# Patient Record
Sex: FEMALE | Race: BLACK OR AFRICAN AMERICAN | Employment: UNEMPLOYED | URBAN - METROPOLITAN AREA
[De-identification: names, ages, dates, MRNs, and addresses within clinical notes are randomized per-mention and may not be internally consistent; named-entity substitution may affect disease eponyms.]

---

## 2017-03-30 ENCOUNTER — OFFICE VISIT (OUTPATIENT)
Dept: OBGYN CLINIC | Age: 22
End: 2017-03-30
Payer: MEDICAID

## 2017-03-30 VITALS
BODY MASS INDEX: 18.61 KG/M2 | DIASTOLIC BLOOD PRESSURE: 71 MMHG | HEART RATE: 91 BPM | RESPIRATION RATE: 12 BRPM | HEIGHT: 64 IN | WEIGHT: 109 LBS | SYSTOLIC BLOOD PRESSURE: 112 MMHG

## 2017-03-30 DIAGNOSIS — Z30.011 FAMILY PLANNING, BCP (BIRTH CONTROL PILLS) INITIAL PRESCRIPTION: Primary | ICD-10-CM

## 2017-03-30 DIAGNOSIS — Z78.9 NON-SMOKER: ICD-10-CM

## 2017-03-30 PROCEDURE — 99213 OFFICE O/P EST LOW 20 MIN: CPT | Performed by: NURSE PRACTITIONER

## 2017-03-30 RX ORDER — NORETHINDRONE ACETATE AND ETHINYL ESTRADIOL .03; 1.5 MG/1; MG/1
1 TABLET ORAL DAILY
Qty: 30 TABLET | Refills: 12 | Status: SHIPPED | OUTPATIENT
Start: 2017-03-30

## 2017-03-30 ASSESSMENT — ENCOUNTER SYMPTOMS
ALLERGIC/IMMUNOLOGIC NEGATIVE: 1
EYES NEGATIVE: 1
RESPIRATORY NEGATIVE: 1
GASTROINTESTINAL NEGATIVE: 1

## 2017-06-06 ENCOUNTER — HOSPITAL ENCOUNTER (EMERGENCY)
Age: 22
Discharge: HOME | End: 2017-06-06
Attending: EMERGENCY MEDICINE
Payer: MEDICAID

## 2017-06-06 VITALS
RESPIRATION RATE: 20 BRPM | SYSTOLIC BLOOD PRESSURE: 114 MMHG | DIASTOLIC BLOOD PRESSURE: 74 MMHG | WEIGHT: 111 LBS | OXYGEN SATURATION: 98 % | TEMPERATURE: 99.4 F | HEIGHT: 64 IN | BODY MASS INDEX: 18.95 KG/M2 | HEART RATE: 94 BPM

## 2017-06-06 DIAGNOSIS — J06.9 VIRAL URI WITH COUGH: Primary | ICD-10-CM

## 2017-06-06 DIAGNOSIS — J02.9 ACUTE PHARYNGITIS, UNSPECIFIED ETIOLOGY: ICD-10-CM

## 2017-06-06 LAB
DIRECT EXAM: NORMAL
Lab: NORMAL
Lab: NORMAL
SPECIMEN DESCRIPTION: NORMAL
SPECIMEN DESCRIPTION: NORMAL
STATUS: NORMAL
STATUS: NORMAL

## 2017-06-06 PROCEDURE — 6370000000 HC RX 637 (ALT 250 FOR IP): Performed by: EMERGENCY MEDICINE

## 2017-06-06 PROCEDURE — 87651 STREP A DNA AMP PROBE: CPT

## 2017-06-06 PROCEDURE — 99283 EMERGENCY DEPT VISIT LOW MDM: CPT

## 2017-06-06 RX ORDER — IBUPROFEN 800 MG/1
800 TABLET ORAL EVERY 8 HOURS PRN
Qty: 120 TABLET | Refills: 0 | Status: SHIPPED | OUTPATIENT
Start: 2017-06-06

## 2017-06-06 RX ORDER — IBUPROFEN 800 MG/1
800 TABLET ORAL ONCE
Status: COMPLETED | OUTPATIENT
Start: 2017-06-06 | End: 2017-06-06

## 2017-06-06 RX ADMIN — BENZOCAINE AND MENTHOL 1 LOZENGE: 15; 3.6 LOZENGE ORAL at 21:13

## 2017-06-06 RX ADMIN — IBUPROFEN 800 MG: 800 TABLET ORAL at 21:12

## 2017-06-06 ASSESSMENT — PAIN SCALES - GENERAL
PAINLEVEL_OUTOF10: 7
PAINLEVEL_OUTOF10: 7

## 2017-06-06 ASSESSMENT — PAIN DESCRIPTION - LOCATION: LOCATION: THROAT;GENERALIZED

## 2017-06-06 ASSESSMENT — PAIN DESCRIPTION - DESCRIPTORS: DESCRIPTORS: SPASM;SHARP

## 2017-06-06 ASSESSMENT — PAIN DESCRIPTION - PAIN TYPE: TYPE: ACUTE PAIN

## 2017-06-06 ASSESSMENT — PAIN DESCRIPTION - FREQUENCY: FREQUENCY: CONTINUOUS

## 2017-06-07 ASSESSMENT — ENCOUNTER SYMPTOMS
NAUSEA: 0
VOMITING: 0
DIARRHEA: 0
COUGH: 1
SINUS PRESSURE: 0
SORE THROAT: 1
ABDOMINAL PAIN: 0
RHINORRHEA: 1

## 2017-08-10 ENCOUNTER — HOSPITAL ENCOUNTER (EMERGENCY)
Age: 22
Discharge: HOME | End: 2017-08-10
Attending: EMERGENCY MEDICINE
Payer: MEDICAID

## 2017-08-10 VITALS
HEART RATE: 80 BPM | OXYGEN SATURATION: 100 % | BODY MASS INDEX: 18.78 KG/M2 | SYSTOLIC BLOOD PRESSURE: 113 MMHG | TEMPERATURE: 98.2 F | RESPIRATION RATE: 17 BRPM | HEIGHT: 64 IN | DIASTOLIC BLOOD PRESSURE: 79 MMHG | WEIGHT: 110 LBS

## 2017-08-10 DIAGNOSIS — N39.0 URINARY TRACT INFECTION WITHOUT HEMATURIA, SITE UNSPECIFIED: Primary | ICD-10-CM

## 2017-08-10 LAB
-: ABNORMAL
AMORPHOUS: ABNORMAL
BACTERIA: ABNORMAL
BILIRUBIN URINE: NEGATIVE
CASTS UA: ABNORMAL /LPF (ref 0–8)
COLOR: YELLOW
CRYSTALS, UA: ABNORMAL /HPF
EPITHELIAL CELLS UA: ABNORMAL /HPF (ref 0–5)
GLUCOSE URINE: NEGATIVE
HCG(URINE) PREGNANCY TEST: NEGATIVE
KETONES, URINE: ABNORMAL
LEUKOCYTE ESTERASE, URINE: ABNORMAL
MUCUS: ABNORMAL
NITRITE, URINE: NEGATIVE
OTHER OBSERVATIONS UA: ABNORMAL
PH UA: 6.5 (ref 5–8)
PROTEIN UA: ABNORMAL
RBC UA: ABNORMAL /HPF (ref 0–4)
RENAL EPITHELIAL, UA: ABNORMAL /HPF
SPECIFIC GRAVITY UA: 1.04 (ref 1–1.03)
TRICHOMONAS: ABNORMAL
TURBIDITY: CLEAR
URINE HGB: NEGATIVE
UROBILINOGEN, URINE: NORMAL
WBC UA: ABNORMAL /HPF (ref 0–5)
YEAST: ABNORMAL

## 2017-08-10 PROCEDURE — 84703 CHORIONIC GONADOTROPIN ASSAY: CPT

## 2017-08-10 PROCEDURE — 81001 URINALYSIS AUTO W/SCOPE: CPT

## 2017-08-10 PROCEDURE — 99284 EMERGENCY DEPT VISIT MOD MDM: CPT

## 2017-08-10 PROCEDURE — 6370000000 HC RX 637 (ALT 250 FOR IP): Performed by: EMERGENCY MEDICINE

## 2017-08-10 RX ORDER — CEPHALEXIN 500 MG/1
500 CAPSULE ORAL ONCE
Status: COMPLETED | OUTPATIENT
Start: 2017-08-10 | End: 2017-08-10

## 2017-08-10 RX ORDER — CEPHALEXIN 500 MG/1
500 CAPSULE ORAL 4 TIMES DAILY
Qty: 28 CAPSULE | Refills: 0 | Status: SHIPPED | OUTPATIENT
Start: 2017-08-10 | End: 2017-08-17

## 2017-08-10 RX ADMIN — CEPHALEXIN 500 MG: 500 CAPSULE ORAL at 23:19

## 2017-08-10 ASSESSMENT — ENCOUNTER SYMPTOMS
CONSTIPATION: 0
DIARRHEA: 0
VOMITING: 0
BACK PAIN: 0
SHORTNESS OF BREATH: 1
NAUSEA: 0
ABDOMINAL PAIN: 1
BLOOD IN STOOL: 0

## 2017-08-10 ASSESSMENT — PAIN DESCRIPTION - LOCATION: LOCATION: ABDOMEN

## 2017-08-10 ASSESSMENT — PAIN DESCRIPTION - ORIENTATION: ORIENTATION: LEFT

## 2017-08-10 ASSESSMENT — PAIN DESCRIPTION - PAIN TYPE: TYPE: ACUTE PAIN

## 2017-08-10 ASSESSMENT — PAIN DESCRIPTION - DESCRIPTORS: DESCRIPTORS: SHARP

## 2017-08-10 ASSESSMENT — PAIN SCALES - GENERAL: PAINLEVEL_OUTOF10: 6

## 2017-08-10 ASSESSMENT — PAIN DESCRIPTION - FREQUENCY: FREQUENCY: CONTINUOUS

## 2017-12-06 ENCOUNTER — HOSPITAL ENCOUNTER (OUTPATIENT)
Age: 22
Setting detail: SPECIMEN
Discharge: HOME | End: 2017-12-06
Payer: MEDICAID

## 2017-12-06 ENCOUNTER — OFFICE VISIT (OUTPATIENT)
Dept: OBGYN CLINIC | Age: 22
End: 2017-12-06
Payer: MEDICAID

## 2017-12-06 VITALS
BODY MASS INDEX: 17.58 KG/M2 | RESPIRATION RATE: 14 BRPM | SYSTOLIC BLOOD PRESSURE: 106 MMHG | WEIGHT: 103 LBS | HEIGHT: 64 IN | HEART RATE: 67 BPM | TEMPERATURE: 98.5 F | DIASTOLIC BLOOD PRESSURE: 66 MMHG

## 2017-12-06 DIAGNOSIS — Z01.419 ENCOUNTER FOR ANNUAL ROUTINE GYNECOLOGICAL EXAMINATION: ICD-10-CM

## 2017-12-06 DIAGNOSIS — N89.8 VAGINAL ODOR: ICD-10-CM

## 2017-12-06 DIAGNOSIS — N89.8 VAGINAL ODOR: Primary | ICD-10-CM

## 2017-12-06 DIAGNOSIS — F12.10 MILD TETRAHYDROCANNABINOL (THC) ABUSE: ICD-10-CM

## 2017-12-06 DIAGNOSIS — Z78.9 NON-SMOKER: ICD-10-CM

## 2017-12-06 DIAGNOSIS — Q83.3 EXTRA NIPPLE: ICD-10-CM

## 2017-12-06 LAB
DIRECT EXAM: ABNORMAL
Lab: ABNORMAL
SPECIMEN DESCRIPTION: ABNORMAL
STATUS: ABNORMAL

## 2017-12-06 PROCEDURE — 99395 PREV VISIT EST AGE 18-39: CPT | Performed by: NURSE PRACTITIONER

## 2017-12-06 ASSESSMENT — ENCOUNTER SYMPTOMS
RESPIRATORY NEGATIVE: 1
GASTROINTESTINAL NEGATIVE: 1
EYES NEGATIVE: 1

## 2017-12-06 ASSESSMENT — PATIENT HEALTH QUESTIONNAIRE - PHQ9
SUM OF ALL RESPONSES TO PHQ9 QUESTIONS 1 & 2: 0
1. LITTLE INTEREST OR PLEASURE IN DOING THINGS: 0
SUM OF ALL RESPONSES TO PHQ QUESTIONS 1-9: 0
2. FEELING DOWN, DEPRESSED OR HOPELESS: 0

## 2017-12-06 NOTE — PROGRESS NOTES
[facility] PHYSICIANS  [facility] [address]  [address] [place] [id number]  Dept: [phone]    Chief complaint:   Chief Complaint   Patient presents with    Annual Exam     not taking OCP, due to nausea        History Present Illness:   Carlos Talamantes is here today for her annual exam. She reports that she has not had taken her birth control pills since August because they were making her sick. Reports that she rarely uses condoms and does not want another baby at this time. States a lot of vaginal discharge and odor . Reports has not changed sexual partners. Current Medications (OTC/Herbal):   Current Outpatient Prescriptions   Medication Sig Dispense Refill    ibuprofen (ADVIL;MOTRIN) 800 MG tablet Take 1 tablet by mouth every 8 hours as needed for Pain or Fever 120 tablet 0    benzocaine-menthol (CEPACOL SORE THROAT) 15-3.6 MG lozenge Take 1 lozenge by mouth every 2 hours as needed for Sore Throat 18 lozenge 2    Norethindrone Acet-Ethinyl Est (LOESTRIN 1.5/30, 21,) 1.5-30 MG-MCG TABS Take 1 tablet by mouth daily 30 tablet 12     No current facility-administered medications for this visit. Allergies: No Known Allergies  Past Medical History: History reviewed. No pertinent past medical history. Past Surgical History: History reviewed. No pertinent surgical history.   Obstetric History:   3  Para 1  Gynecologic History: LNMP 15   Menarche 11  Duration 5 d    Interval q  28-30 d  Tampons/Pads in a day: NA  Last Pap:  10/16       Any history of abnormal paps none    PriorColpo/Biopsy none     Last Mammogram NA   Result  Contraception: none  Complications: none  STDs: chlamydia  Psychosocial History: Occupation:   housekeeping   Smoker No    PPD/PPYr    Alcohol/Amount No    Drug Use Yes  Marijuana    Type:    Caffeine Yes    At risk for depression No    Abuse:   No    Social History     Social History    Marital status: Single     Spouse name: N/A    Number of children: N/A    Years of education: N/A     Occupational History    Not on file. Social History Main Topics    Smoking status: Never Smoker    Smokeless tobacco: Never Used    Alcohol use No      Comment: occ    Drug use: No    Sexual activity: Yes     Partners: Male     Birth control/ protection: Injection     Other Topics Concern    Not on file     Social History Narrative    No narrative on file       Family History   Problem Relation Age of Onset    Diabetes Maternal Grandmother     High Blood Pressure Maternal Grandmother        Review of Systems:   Review of Systems   HENT: Negative. Eyes: Negative. Respiratory: Negative. Cardiovascular: Negative. Gastrointestinal: Negative. Genitourinary:        Reports odor with urination    Skin: Negative. Neurological: Negative. Endo/Heme/Allergies: Negative. Psychiatric/Behavioral: Negative. Physical exam:  vitals: /66 (Site: Right Arm, Position: Sitting, Cuff Size: Small Adult)   Pulse 67   Temp 98.5 °F (36.9 °C) (Oral)   Resp 14   Ht 5' 4\" (1.626 m)   Wt 103 lb (46.7 kg)   LMP 11/24/2017   BMI 17.68 kg/m²   Gen: alert, no apparent distress  HEENT: No pathologic skin lesions noted,NC/AT, normal midline nontender thyroid   Lung Exam: Clear to auscultation in all fields bilaterally, without wheezes,rales or rhonchi. Cardiac Exam: Normal sinus rhythm and rate, without murmurs, rubs or gallops appreciated. Breast Exam: Symmetric without pathological skin changes, nontender without discrete suspicious masses palpated, supraclavicular or axillary adenopathy or nipple discharge noted. Abdominal Exam: Nontender to deep palpation without organomegaly, masses or CVAT appreciated, BS positive. No spinal deformation or tenderness. External Genitalia: Normal development without vulvar, vaginal or cervical lesions noted. Normal vaginal discharge, uterus anterior, 4-6 weeks without CMT.   Adnexa nontender without abnormal masses

## 2017-12-06 NOTE — PATIENT INSTRUCTIONS
Patient Education        Well Visit, Ages 25 to 48: Care Instructions  Your Care Instructions    Physical exams can help you stay healthy. Your doctor has checked your overall health and may have suggested ways to take good care of yourself. He or she also may have recommended tests. At home, you can help prevent illness with healthy eating, regular exercise, and other steps. Follow-up care is a key part of your treatment and safety. Be sure to make and go to all appointments, and call your doctor if you are having problems. It's also a good idea to know your test results and keep a list of the medicines you take. How can you care for yourself at home? · Reach and stay at a healthy weight. This will lower your risk for many problems, such as obesity, diabetes, heart disease, and high blood pressure. · Get at least 30 minutes of physical activity on most days of the week. Walking is a good choice. You also may want to do other activities, such as running, swimming, cycling, or playing tennis or team sports. Discuss any changes in your exercise program with your doctor. · Do not smoke or allow others to smoke around you. If you need help quitting, talk to your doctor about stop-smoking programs and medicines. These can increase your chances of quitting for good. · Talk to your doctor about whether you have any risk factors for sexually transmitted infections (STIs). Having one sex partner (who does not have STIs and does not have sex with anyone else) is a good way to avoid these infections. · Use birth control if you do not want to have children at this time. Talk with your doctor about the choices available and what might be best for you. · Protect your skin from too much sun. When you're outdoors from 10 a.m. to 4 p.m., stay in the shade or cover up with clothing and a hat with a wide brim. Wear sunglasses that block UV rays.  Even when it's cloudy, put broad-spectrum sunscreen (SPF 30 or higher) on any exposed skin. · See a dentist one or two times a year for checkups and to have your teeth cleaned. · Wear a seat belt in the car. · Drink alcohol in moderation, if at all. That means no more than 2 drinks a day for men and 1 drink a day for women. Follow your doctor's advice about when to have certain tests. These tests can spot problems early. For everyone  · Cholesterol. Have the fat (cholesterol) in your blood tested after age 21. Your doctor will tell you how often to have this done based on your age, family history, or other things that can increase your risk for heart disease. · Blood pressure. Have your blood pressure checked during a routine doctor visit. Your doctor will tell you how often to check your blood pressure based on your age, your blood pressure results, and other factors. · Vision. Talk with your doctor about how often to have a glaucoma test.  · Diabetes. Ask your doctor whether you should have tests for diabetes. · Colon cancer. Have a test for colon cancer at age 48. You may have one of several tests. If you are younger than 48, you may need a test earlier if you have any risk factors. Risk factors include whether you already had a precancerous polyp removed from your colon or whether your parent, brother, sister, or child has had colon cancer. For women  · Breast exam and mammogram. Talk to your doctor about when you should have a clinical breast exam and a mammogram. Medical experts differ on whether and how often women under 50 should have these tests. Your doctor can help you decide what is right for you. · Pap test and pelvic exam. Begin Pap tests at age 24. A Pap test is the best way to find cervical cancer. The test often is part of a pelvic exam. Ask how often to have this test.  · Tests for sexually transmitted infections (STIs). Ask whether you should have tests for STIs.  You may be at risk if you have sex with more than one person, especially if your partners do not wear

## 2017-12-07 ENCOUNTER — TELEPHONE (OUTPATIENT)
Dept: OBGYN CLINIC | Age: 22
End: 2017-12-07

## 2017-12-07 DIAGNOSIS — A74.9 CHLAMYDIA: Primary | ICD-10-CM

## 2017-12-07 LAB
C TRACH DNA GENITAL QL NAA+PROBE: ABNORMAL
N. GONORRHOEAE DNA: NEGATIVE

## 2017-12-07 RX ORDER — AZITHROMYCIN 500 MG/1
TABLET, FILM COATED ORAL
Qty: 2 TABLET | Refills: 0 | Status: SHIPPED | OUTPATIENT
Start: 2017-12-07

## 2017-12-07 NOTE — TELEPHONE ENCOUNTER
Patient called in stating that when she was in the office yesterday she spoke to you about her current BC pills making her sick and a different BC prescription was suppose to be sent in. I see no notes about that be discussed and there are no new prescription for bc. Do you recall this.

## 2017-12-19 LAB
CYTOLOGY REPORT: NORMAL
HPV SAMPLE: ABNORMAL
HPV SOURCE: ABNORMAL
HPV, GENOTYPE 16: NOT DETECTED
HPV, GENOTYPE 18: NOT DETECTED
HPV, HIGH RISK OTHER: DETECTED
HPV, INTERPRETATION: ABNORMAL

## 2017-12-28 ENCOUNTER — OFFICE VISIT (OUTPATIENT)
Dept: OBGYN CLINIC | Age: 22
End: 2017-12-28
Payer: MEDICAID

## 2017-12-28 ENCOUNTER — HOSPITAL ENCOUNTER (OUTPATIENT)
Age: 22
Setting detail: SPECIMEN
Discharge: HOME | End: 2017-12-28
Payer: MEDICAID

## 2017-12-28 VITALS
DIASTOLIC BLOOD PRESSURE: 61 MMHG | BODY MASS INDEX: 18.27 KG/M2 | OXYGEN SATURATION: 100 % | WEIGHT: 107 LBS | HEART RATE: 85 BPM | HEIGHT: 64 IN | RESPIRATION RATE: 12 BRPM | SYSTOLIC BLOOD PRESSURE: 101 MMHG

## 2017-12-28 DIAGNOSIS — R35.0 URINARY FREQUENCY: ICD-10-CM

## 2017-12-28 DIAGNOSIS — Z86.19 HISTORY OF CHLAMYDIA: ICD-10-CM

## 2017-12-28 DIAGNOSIS — B96.89 BV (BACTERIAL VAGINOSIS): Primary | ICD-10-CM

## 2017-12-28 DIAGNOSIS — N76.0 BV (BACTERIAL VAGINOSIS): Primary | ICD-10-CM

## 2017-12-28 PROCEDURE — G8427 DOCREV CUR MEDS BY ELIG CLIN: HCPCS | Performed by: NURSE PRACTITIONER

## 2017-12-28 PROCEDURE — 99213 OFFICE O/P EST LOW 20 MIN: CPT | Performed by: NURSE PRACTITIONER

## 2017-12-28 PROCEDURE — G8484 FLU IMMUNIZE NO ADMIN: HCPCS | Performed by: NURSE PRACTITIONER

## 2017-12-28 PROCEDURE — 1036F TOBACCO NON-USER: CPT | Performed by: NURSE PRACTITIONER

## 2017-12-28 PROCEDURE — G8419 CALC BMI OUT NRM PARAM NOF/U: HCPCS | Performed by: NURSE PRACTITIONER

## 2017-12-28 RX ORDER — METRONIDAZOLE 500 MG/1
500 TABLET ORAL 2 TIMES DAILY
Qty: 14 TABLET | Refills: 0 | Status: SHIPPED | OUTPATIENT
Start: 2017-12-28 | End: 2018-01-04

## 2017-12-28 ASSESSMENT — ENCOUNTER SYMPTOMS
RESPIRATORY NEGATIVE: 1
EYES NEGATIVE: 1
GASTROINTESTINAL NEGATIVE: 1
ALLERGIC/IMMUNOLOGIC NEGATIVE: 1

## 2017-12-28 NOTE — PROGRESS NOTES
dip neg for infection      Plan:      Patient was seen with total face to face time of 15 minutes. More than 50% of this visit was on counseling and education regarding her   1. BV (bacterial vaginosis)  metroNIDAZOLE (FLAGYL) 500 MG tablet    Wet Prep, Genital   2. History of chlamydia  C.trachomatis N.gonorrhoeae DNA   3. Urinary frequency  POCT Urinalysis No Micro (Auto)    and her options. She was also counseled on her preventative health maintenance recommendations and follow-up.

## 2017-12-28 NOTE — PATIENT INSTRUCTIONS
Patient Education        Bacterial Vaginosis: Care Instructions  Your Care Instructions    Bacterial vaginosis is a type of vaginal infection. It is caused by excess growth of certain bacteria that are normally found in the vagina. Symptoms can include itching, swelling, pain when you urinate or have sex, and a gray or yellow discharge with a \"fishy\" odor. It is not considered an infection that is spread through sexual contact. Although symptoms can be annoying and uncomfortable, bacterial vaginosis does not usually cause other health problems. However, if you have it while you are pregnant, it can cause complications. While the infection may go away on its own, most doctors use antibiotics to treat it. You may have been prescribed pills or vaginal cream. With treatment, bacterial vaginosis usually clears up in 5 to 7 days. Follow-up care is a key part of your treatment and safety. Be sure to make and go to all appointments, and call your doctor if you are having problems. It's also a good idea to know your test results and keep a list of the medicines you take. How can you care for yourself at home? · Take your antibiotics as directed. Do not stop taking them just because you feel better. You need to take the full course of antibiotics. · Do not eat or drink anything that contains alcohol if you are taking metronidazole (Flagyl). · Keep using your medicine if you start your period. Use pads instead of tampons while using a vaginal cream or suppository. Tampons can absorb the medicine. · Wear loose cotton clothing. Do not wear nylon and other materials that hold body heat and moisture close to the skin. · Do not scratch. Relieve itching with a cold pack or a cool bath. · Do not wash your vaginal area more than once a day. Use plain water or a mild, unscented soap. Do not douche. When should you call for help?   Watch closely for changes in your health, and be sure to contact your doctor if:  ? · You have listed in this medication guide. What should I discuss with my healthcare provider before taking metronidazole? You should not use this medication if you are allergic to metronidazole, or if you are in the first trimester of pregnancy. Tell your doctor if you are pregnant or plan to become pregnant during treatment. Before taking metronidazole, tell your doctor if you are allergic to any drugs, or if you have:  · liver disease;  · a stomach or intestinal disease such as Crohn's disease;  · a blood cell disorder such as anemia (lack of red blood cells) or leukopenia (lack of white blood cells);  · epilepsy or other seizure disorder; or  · nerve disorders. If you have any of these conditions, you may need a dose adjustment or special tests to safely take this medication. Metronidazole can pass into breast milk and may harm a nursing baby. Do not use this medication without telling your doctor if you are breast-feeding a baby. How should I take metronidazole? Take exactly as prescribed by your doctor. Do not take in larger or smaller amounts or for longer than recommended. Follow the directions on your prescription label. Take the extended-release metronidazole tablet (Flagyl ER) on an empty stomach, at least 1 hour before or 2 hours after eating a meal.  Do not crush, chew, or break an extended-release tablet. Swallow it whole. Breaking the pill may cause too much of the drug to be released at one time. Take this medication for the full prescribed length of time. Your symptoms may improve before the infection is completely cleared. Skipping doses may also increase your risk of further infection that is resistant to antibiotics. Metronidazole will not treat a viral infection such as the common cold or flu. To be sure this medication is not causing harmful effects, your blood may need to be tested often. Your liver function may also need to be tested. Visit your doctor regularly.   This medication can cause unusual results with certain medical tests. Tell any doctor who treats you that you are using metronidazole. Store at room temperature away from moisture and heat. What happens if I miss a dose? Take the missed dose as soon as you remember. Skip the missed dose if it is almost time for your next scheduled dose. Do not take extra medicine to make up the missed dose. What happens if I overdose? Seek emergency medical attention or call the Poison Help line at 1-566.664.4721. Overdose symptoms may include nausea, vomiting, dizziness, loss of balance or coordination, numbness and tingling, or seizures (convulsions). What should I avoid while taking metronidazole? Do not drink alcohol while you are taking metronidazole and for at least 3 days after you stop taking it. You may have unpleasant side effects such as fast heartbeats, warmth or redness under your skin, tingly feeling, nausea, and vomiting. Check the labels of any medicines or food products you use to make sure they do not contain alcohol. Antibiotic medicines can cause diarrhea, which may be a sign of a new infection. If you have diarrhea that is watery or bloody, stop taking metronidazole and call your doctor. Do not use anti-diarrhea medicine unless your doctor tells you to. What are the possible side effects of metronidazole? Get emergency medical help if you have any of these signs of an allergic reaction: hives; difficulty breathing; swelling of your face, lips, tongue, or throat.   Call your doctor at once if you have any of these serious side effects:  · numbness or tingling in your hands or feet;  · white patches or sores inside your mouth or on your lips;  · pain or burning when you urinate;  · diarrhea that is watery or bloody;  · vision problems, pain behind your eyes;  · trouble concentrating, slurred speech, mood or behavior changes, tremors, muscle twitching, seizure (convulsions);  · fever, chills, muscle pain, confusion, headache, sore throat, neck stiffness, increased sensitivity to light, drowsiness, nausea and vomiting; or  · severe skin reaction -- fever, sore throat, swelling in your face or tongue, burning in your eyes, skin pain, followed by a red or purple skin rash that spreads (especially in the face or upper body) and causes blistering and peeling. Less serious side effects may include:  · stomach pain, diarrhea;  · dizziness, loss of balance;  · vaginal itching or discharge;  · dry mouth or unpleasant metallic taste;  · cough, sneezing, runny or stuffy nose; or  · swollen or sore tongue. This is not a complete list of side effects and others may occur. Call your doctor for medical advice about side effects. You may report side effects to FDA at 5-992-FDA-1360. What other drugs will affect metronidazole? Tell your doctor about all other medicines you use, especially:  · cimetidine (Tagamet);  · seizure medication such as phenytoin (Dilantin) or phenobarbital (Luminal, Solfoton);  · a blood thinner such as warfarin (Coumadin, Jantoven);  · lithium (Lithobid, Eskalith, others); or  · disulfiram (Antabuse). This list is not complete and other drugs may interact with metronidazole. Tell your doctor about all medications you use. This includes prescription, over-the-counter, vitamin, and herbal products. Do not start a new medication without telling your doctor. Where can I get more information? Your pharmacist can provide more information about metronidazole. Remember, keep this and all other medicines out of the reach of children, never share your medicines with others, and use this medication only for the indication prescribed. Every effort has been made to ensure that the information provided by [facility] is accurate, up-to-date, and complete, but no guarantee is made to that effect. Drug information contained herein may be time sensitive.  [facility] information has been compiled for use by healthcare practitioners and consumers in the [place] and therefore [facility] does not warrant that uses outside of the [place] are appropriate, unless specifically indicated otherwise. [facility]'s drug information does not endorse drugs, diagnose patients or recommend therapy. [facility] drug information is an informational resource designed to assist licensed healthcare practitioners in caring for their patients and/or to serve consumers viewing this service as a supplement to, and not a substitute for, the expertise, skill, knowledge and judgment of healthcare practitioners. The absence of a warning for a given drug or drug combination in no way should be construed to indicate that the drug or drug combination is safe, effective or appropriate for any given patient. [facility] does not assume any responsibility for any aspect of healthcare administered with the aid of information [facility] provides. The information contained herein is not intended to cover all possible uses, directions, precautions, warnings, drug interactions, allergic reactions, or adverse effects. If you have questions about the drugs you are taking, check with your doctor, nurse or pharmacist.  Copyright 8454-1450 [address]. Version: 10.01. Revision date: 2/14/2011. Care instructions adapted under license by [facility] ([facility]). If you have questions about a medical condition or this instruction, always ask your healthcare professional. [place] [address] any warranty or liability for your use of this information.

## 2017-12-29 LAB
C TRACH DNA GENITAL QL NAA+PROBE: NEGATIVE
N. GONORRHOEAE DNA: NEGATIVE

## 2018-10-28 ENCOUNTER — HOSPITAL ENCOUNTER (EMERGENCY)
Age: 23
Discharge: HOME | End: 2018-10-28
Attending: EMERGENCY MEDICINE
Payer: MEDICAID

## 2018-10-28 VITALS
BODY MASS INDEX: 18.44 KG/M2 | OXYGEN SATURATION: 97 % | HEART RATE: 57 BPM | HEIGHT: 64 IN | RESPIRATION RATE: 18 BRPM | DIASTOLIC BLOOD PRESSURE: 81 MMHG | SYSTOLIC BLOOD PRESSURE: 118 MMHG | TEMPERATURE: 98.1 F | WEIGHT: 108 LBS

## 2018-10-28 DIAGNOSIS — N76.0 BACTERIAL VAGINOSIS: ICD-10-CM

## 2018-10-28 DIAGNOSIS — A64 STI (SEXUALLY TRANSMITTED INFECTION): Primary | ICD-10-CM

## 2018-10-28 DIAGNOSIS — B96.89 BACTERIAL VAGINOSIS: ICD-10-CM

## 2018-10-28 LAB
BILIRUBIN URINE: NEGATIVE
COLOR: YELLOW
COMMENT UA: NORMAL
DIRECT EXAM: ABNORMAL
GLUCOSE URINE: NEGATIVE
HCG(URINE) PREGNANCY TEST: NEGATIVE
HIV AG/AB: NONREACTIVE
KETONES, URINE: NEGATIVE
LEUKOCYTE ESTERASE, URINE: NEGATIVE
Lab: ABNORMAL
NITRITE, URINE: NEGATIVE
PH UA: 5 (ref 5–8)
PROTEIN UA: NEGATIVE
SPECIFIC GRAVITY UA: 1.02 (ref 1–1.03)
SPECIMEN DESCRIPTION: ABNORMAL
STATUS: ABNORMAL
T. PALLIDUM, IGG: NONREACTIVE
TURBIDITY: CLEAR
URINE HGB: NEGATIVE
UROBILINOGEN, URINE: NORMAL

## 2018-10-28 PROCEDURE — 81003 URINALYSIS AUTO W/O SCOPE: CPT

## 2018-10-28 PROCEDURE — 87660 TRICHOMONAS VAGIN DIR PROBE: CPT

## 2018-10-28 PROCEDURE — 87491 CHLMYD TRACH DNA AMP PROBE: CPT

## 2018-10-28 PROCEDURE — 87510 GARDNER VAG DNA DIR PROBE: CPT

## 2018-10-28 PROCEDURE — 86780 TREPONEMA PALLIDUM: CPT

## 2018-10-28 PROCEDURE — 6360000002 HC RX W HCPCS: Performed by: EMERGENCY MEDICINE

## 2018-10-28 PROCEDURE — 84703 CHORIONIC GONADOTROPIN ASSAY: CPT

## 2018-10-28 PROCEDURE — 96372 THER/PROPH/DIAG INJ SC/IM: CPT

## 2018-10-28 PROCEDURE — 6370000000 HC RX 637 (ALT 250 FOR IP): Performed by: EMERGENCY MEDICINE

## 2018-10-28 PROCEDURE — 87389 HIV-1 AG W/HIV-1&-2 AB AG IA: CPT

## 2018-10-28 PROCEDURE — 87591 N.GONORRHOEAE DNA AMP PROB: CPT

## 2018-10-28 PROCEDURE — 87480 CANDIDA DNA DIR PROBE: CPT

## 2018-10-28 PROCEDURE — 99283 EMERGENCY DEPT VISIT LOW MDM: CPT

## 2018-10-28 RX ORDER — AZITHROMYCIN 250 MG/1
1000 TABLET, FILM COATED ORAL ONCE
Status: COMPLETED | OUTPATIENT
Start: 2018-10-28 | End: 2018-10-28

## 2018-10-28 RX ORDER — CEFTRIAXONE SODIUM 250 MG/1
250 INJECTION, POWDER, FOR SOLUTION INTRAMUSCULAR; INTRAVENOUS ONCE
Status: COMPLETED | OUTPATIENT
Start: 2018-10-28 | End: 2018-10-28

## 2018-10-28 RX ORDER — METRONIDAZOLE 500 MG/1
500 TABLET ORAL 2 TIMES DAILY
Qty: 14 TABLET | Refills: 0 | Status: SHIPPED | OUTPATIENT
Start: 2018-10-28 | End: 2018-11-04

## 2018-10-28 RX ORDER — METRONIDAZOLE 500 MG/1
500 TABLET ORAL ONCE
Status: COMPLETED | OUTPATIENT
Start: 2018-10-28 | End: 2018-10-28

## 2018-10-28 RX ADMIN — CEFTRIAXONE SODIUM 250 MG: 250 INJECTION, POWDER, FOR SOLUTION INTRAMUSCULAR; INTRAVENOUS at 16:23

## 2018-10-28 RX ADMIN — AZITHROMYCIN 1000 MG: 250 TABLET, FILM COATED ORAL at 16:23

## 2018-10-28 RX ADMIN — METRONIDAZOLE 500 MG: 500 TABLET ORAL at 18:33

## 2018-10-28 ASSESSMENT — ENCOUNTER SYMPTOMS
ABDOMINAL PAIN: 0
NAUSEA: 0
EYE PAIN: 0
VOMITING: 0
SHORTNESS OF BREATH: 0
DIARRHEA: 0
COUGH: 0
CONSTIPATION: 0
RHINORRHEA: 0

## 2018-10-28 NOTE — ED NOTES
Labeled blood and urine specimens collected and sent to lab via tube system.      Danica Cedillo RN  10/28/18 9734

## 2018-10-28 NOTE — ED PROVIDER NOTES
POSITIVE for Gardnerella vaginalis. (A)     Direct Exam NEGATIVE for Candida sp. Direct Exam NEGATIVE for Trichomonas vaginalis     Direct Exam       Method of testing is a DNA probe intended for detection and identification of    Direct Exam        Candida species, Gardnerella vaginalis, and Trichomonas vaginalis nucleic acid    Direct Exam        in vaginal fluid specimens from patients with symptoms of vaginitis/vaginosis. Status FINAL 10/28/2018    Urinalysis Reflex to Culture   Result Value Ref Range    Color, UA YELLOW YEL    Turbidity UA CLEAR CLEAR    Glucose, Ur NEGATIVE NEG    Bilirubin Urine NEGATIVE NEG    Ketones, Urine NEGATIVE NEG    Specific Gravity, UA 1.024 1.005 - 1.030    Urine Hgb NEGATIVE NEG    pH, UA 5.0 5.0 - 8.0    Protein, UA NEGATIVE NEG    Urobilinogen, Urine Normal NORM    Nitrite, Urine NEGATIVE NEG    Leukocyte Esterase, Urine NEGATIVE NEG    Urinalysis Comments       Microscopic exam not performed based on chemical results unless requested in   PREGNANCY, URINE   Result Value Ref Range    HCG(Urine) Pregnancy Test NEGATIVE NEG   T. pallidum Ab   Result Value Ref Range    T. pallidum, IgG NONREACTIVE NR   HIV Screen   Result Value Ref Range    HIV Ag/Ab NONREACTIVE NR       IMPRESSION: Patient value added by myself and attending physician. Patient appears in no acute distress. Vital signs normal on arrival.  Abdomen soft, nontender, nondistended. We'll continue with urinalysis, urine pregnant, pelvic labs. We'll get syphilis and HIV testing due to concern for STD. Patient instructed that gathering chlamydia will not come back today and she would like to be treated prophylactically. RADIOLOGY:  None    EKG  None    All EKG's are interpreted by the Emergency Department Physician who either signs or Co-signs this chart in the absence of a cardiologist.    EMERGENCY DEPARTMENT COURSE:  Vaginitis probe positive for BV. Urine pregnancy negative.   Urinalysis without signs of infection. Patient treated prophylactically for gonorrhea and chlamydia. Labs drawn for syphilis and HIV. Patient would like to be discharged. Given Flagyl for home. Patient instructed to follow-up with primary care provider. Patient agrees with plan. Patient given return instructions. PROCEDURES:  None    CONSULTS:  None    CRITICAL CARE:  None    FINAL IMPRESSION      1. STI (sexually transmitted infection)    2.  Bacterial vaginosis          DISPOSITION / PLAN     DISPOSITION Decision To Discharge 10/28/2018 06:26:33 PM      PATIENT REFERRED TO:  your PCP or a clinic list    Call in 1 day  to follow up within one week    [facility]  [address] [place] [address]  [phone]  Today  As needed, If symptoms worsen      DISCHARGE MEDICATIONS:  Discharge Medication List as of 10/28/2018  6:28 PM      START taking these medications    Details   metroNIDAZOLE (FLAGYL) 500 MG tablet Take 1 tablet by mouth 2 times daily for 7 days, Disp-14 tablet, R-0Print             Concha Roa DO  Emergency Medicine Resident    (Please note that portions of thisnote were completed with a voice recognition program.  Efforts were made to edit the dictations but occasionally words are mis-transcribed.)        Concha Roa DO  10/28/18 2122

## 2018-10-29 LAB
C TRACH DNA GENITAL QL NAA+PROBE: NEGATIVE
N. GONORRHOEAE DNA: NEGATIVE

## 2019-05-02 ENCOUNTER — HOSPITAL ENCOUNTER (EMERGENCY)
Age: 24
Discharge: HOME | End: 2019-05-02
Attending: EMERGENCY MEDICINE
Payer: COMMERCIAL

## 2019-05-02 VITALS
DIASTOLIC BLOOD PRESSURE: 72 MMHG | OXYGEN SATURATION: 100 % | HEIGHT: 64 IN | WEIGHT: 112 LBS | HEART RATE: 87 BPM | TEMPERATURE: 97 F | SYSTOLIC BLOOD PRESSURE: 109 MMHG | BODY MASS INDEX: 19.12 KG/M2 | RESPIRATION RATE: 16 BRPM

## 2019-05-02 DIAGNOSIS — N93.9 VAGINAL BLEEDING: Primary | ICD-10-CM

## 2019-05-02 LAB
-: ABNORMAL
AMORPHOUS: ABNORMAL
BACTERIA: ABNORMAL
BILIRUBIN URINE: NEGATIVE
CASTS UA: ABNORMAL /LPF (ref 0–2)
COLOR: YELLOW
COMMENT UA: ABNORMAL
CRYSTALS, UA: ABNORMAL /HPF
DIRECT EXAM: NORMAL
EPITHELIAL CELLS UA: ABNORMAL /HPF (ref 0–5)
GLUCOSE URINE: NEGATIVE
HCG(URINE) PREGNANCY TEST: NEGATIVE
KETONES, URINE: ABNORMAL
LEUKOCYTE ESTERASE, URINE: NEGATIVE
Lab: NORMAL
MUCUS: ABNORMAL
NITRITE, URINE: NEGATIVE
OTHER OBSERVATIONS UA: ABNORMAL
PH UA: 5 (ref 5–8)
PROTEIN UA: NEGATIVE
RBC UA: ABNORMAL /HPF (ref 0–2)
RENAL EPITHELIAL, UA: ABNORMAL /HPF
SPECIFIC GRAVITY UA: 1.03 (ref 1–1.03)
SPECIMEN DESCRIPTION: NORMAL
TRICHOMONAS: ABNORMAL
TURBIDITY: ABNORMAL
URINE HGB: ABNORMAL
UROBILINOGEN, URINE: NORMAL
WBC UA: ABNORMAL /HPF (ref 0–5)
YEAST: ABNORMAL

## 2019-05-02 PROCEDURE — 84703 CHORIONIC GONADOTROPIN ASSAY: CPT

## 2019-05-02 PROCEDURE — 87491 CHLMYD TRACH DNA AMP PROBE: CPT

## 2019-05-02 PROCEDURE — 87510 GARDNER VAG DNA DIR PROBE: CPT

## 2019-05-02 PROCEDURE — 87660 TRICHOMONAS VAGIN DIR PROBE: CPT

## 2019-05-02 PROCEDURE — 99284 EMERGENCY DEPT VISIT MOD MDM: CPT

## 2019-05-02 PROCEDURE — 81001 URINALYSIS AUTO W/SCOPE: CPT

## 2019-05-02 PROCEDURE — 87591 N.GONORRHOEAE DNA AMP PROB: CPT

## 2019-05-02 PROCEDURE — 87480 CANDIDA DNA DIR PROBE: CPT

## 2019-05-02 PROCEDURE — 87086 URINE CULTURE/COLONY COUNT: CPT

## 2019-05-02 ASSESSMENT — ENCOUNTER SYMPTOMS
DIARRHEA: 0
VOMITING: 0
NAUSEA: 0
BACK PAIN: 0
ABDOMINAL PAIN: 0

## 2019-05-02 NOTE — ED PROVIDER NOTES
[facility] ED  Emergency Department Encounter  Mid Level Provider     Pt Name: Tank Sharpe  MRN: [[id number]]  [place] 1995  Date of evaluation: 5/2/19  PCP:  No primary care provider on file. CHIEF COMPLAINT       Chief Complaint   Patient presents with    Vaginal Bleeding     pt with abnormal menstrual cycle. states that she has been spotting and now having clots. pt unsure if pregnant, but is currently trying to get pregnant. denies urinary symptoms at this time. denies risk of std. HISTORY OF PRESENT ILLNESS  (Location/Symptom, Timing/Onset,Context/Setting, Quality, Duration, Modifying Factors, Severity.)      Tank Sharpe is a 20 y.o. female who presents with menstrual cycle that started 2 days ago. Patient states last cycle 2 weeks ago. She is actively trying to get pregnant. Denies abdominal back pain. Denies concern for vaginal discharge or STD. Denies urinary symptoms. Patient well-appearing without apparent distress. One prior pregnancy     PAST MEDICAL /SURGICAL / SOCIAL / FAMILY HISTORY      has no past medical history on file. has no past surgical history on file.     Social History     Socioeconomic History    Marital status: Single     Spouse name: Not on file    Number of children: Not on file    Years of education: Not on file    Highest education level: Not on file   Occupational History    Not on file   Social Needs    Financial resource strain: Not on file    Food insecurity:     Worry: Not on file     Inability: Not on file    Transportation needs:     Medical: Not on file     Non-medical: Not on file   Tobacco Use    Smoking status: Never Smoker    Smokeless tobacco: Never Used   Substance and Sexual Activity    Alcohol use: No     Alcohol/week: 0.0 oz     Comment: occ    Drug use: No    Sexual activity: Yes     Partners: Male     Birth control/protection: Injection   Lifestyle    Physical activity:     Days per week: Not on file Minutes per session: Not on file    Stress: Not on file   Relationships    Social connections:     Talks on phone: Not on file     Gets together: Not on file     Attends Yarsanism service: Not on file     Active member of club or organization: Not on file     Attends meetings of clubs or organizations: Not on file     Relationship status: Not on file    Intimate partner violence:     Fear of current or ex partner: Not on file     Emotionally abused: Not on file     Physically abused: Not on file     Forced sexual activity: Not on file   Other Topics Concern    Not on file   Social History Narrative    Not on file       Family History   Problem Relation Age of Onset    Diabetes Maternal Grandmother     High Blood Pressure Maternal Grandmother        Allergies:  Patient has no known allergies. Home Medications:  Prior to Admission medications    Medication Sig Start Date End Date Taking? Authorizing Provider   azithromycin (ZITHROMAX) 500 MG tablet Take two tablets together 12/7/17   JAZMIN Kim CNP   benzocaine-menthol (CEPACOL SORE THROAT) 15-3.6 MG lozenge Take 1 lozenge by mouth every 2 hours as needed for Sore Throat 6/6/17   Lolly Schafer MD   ibuprofen (ADVIL;MOTRIN) 800 MG tablet Take 1 tablet by mouth every 8 hours as needed for Pain or Fever 6/6/17   Lolly Schafer MD   Norethindrone Acet-Ethinyl Est (Delona Roby 1.5/30, 21,) 1.5-30 MG-MCG TABS Take 1 tablet by mouth daily 3/30/17   JAZMIN Kim CNP       REVIEW OF SYSTEMS    (2-9 systems for level 4, 10 or more for level 5)      Review of Systems   Constitutional: Negative for chills and fever. Gastrointestinal: Negative for abdominal pain, diarrhea, nausea and vomiting. Genitourinary: Positive for vaginal bleeding. Negative for difficulty urinating, dysuria and vaginal discharge. Musculoskeletal: Negative for back pain and myalgias.        PHYSICALEXAM   (upto 7 for level 4, 8 or more for level 5)      INITIAL VITALS:  height is 5' 4\" (1.626 m) and weight is 112 lb (50.8 kg). Her oral temperature is 97 °F (36.1 °C). Her blood pressure is 109/72 and her pulse is 87. Her respiration is 16 and oxygen saturation is 100%. Physical Exam   Constitutional: She is oriented to person, place, and time. She appears well-developed and well-nourished. No distress. HENT:   Head: Normocephalic. Eyes: Pupils are equal, round, and reactive to light. Neck: Normal range of motion. Neck supple. Cardiovascular: Normal rate. Pulmonary/Chest: Effort normal. No respiratory distress. Abdominal: Soft. There is no tenderness. There is no rebound and no guarding. Genitourinary:   Genitourinary Comments: Minimal blood in vault. No cervical motion tenderness, no adnexal tenderness   Musculoskeletal: Normal range of motion. Neurological: She is alert and oriented to person, place, and time. Skin: Skin is warm and dry. Capillary refill takes less than 2 seconds. Psychiatric: She has a normal mood and affect. Her behavior is normal. Judgment and thought content normal.   Nursing note and vitals reviewed. DIFFERENTIAL  DIAGNOSIS   Dysfunctional uterine bleeding, ectopic, miscarriage    PLAN (LABS / IMAGING / EKG):  Orders Placed This Encounter   Procedures    C.trachomatis N.gonorrhoeae DNA    VAGINITIS DNA PROBE    Urine Culture    Pregnancy, Urine    Urinalysis Reflex to Culture    Microscopic Urinalysis       MEDICATIONS ORDERED:  No orders of the defined types were placed in this encounter. Controlled Substances Monitoring:      DIAGNOSTIC RESULTS / EMERGENCY DEPARTMENT COURSE / MDM     RADIOLOGY:   I directly visualized(with the attending physician) the following  images and reviewed the radiologist interpretations:  No results found.     No orders to display       LABS:  Results for orders placed or performed during the hospital encounter of 05/02/19   VAGINITIS DNA PROBE   Result Value Ref Range    Specimen Description . VAGINA     Special Requests NOT REPORTED     Direct Exam NEGATIVE for Gardnerella vaginalis     Direct Exam NEGATIVE for Trichomonas vaginalis     Direct Exam NEGATIVE for Candida sp. Direct Exam       Method of testing is a DNA probe intended for detection and identification of Candida species, Gardnerella vaginalis, and Trichomonas vaginalis nucleic acid in vaginal fluid specimens from patients with symptoms of vaginitis/vaginosis. Pregnancy, Urine   Result Value Ref Range    HCG(Urine) Pregnancy Test NEGATIVE NEGATIVE   Urinalysis Reflex to Culture   Result Value Ref Range    Color, UA YELLOW YELLOW    Turbidity UA CLOUDY (A) CLEAR    Glucose, Ur NEGATIVE NEGATIVE    Bilirubin Urine NEGATIVE NEGATIVE    Ketones, Urine TRACE (A) NEGATIVE    Specific Gravity, UA 1.029 1.005 - 1.030    Urine Hgb LARGE (A) NEGATIVE    pH, UA 5.0 5.0 - 8.0    Protein, UA NEGATIVE NEGATIVE    Urobilinogen, Urine Normal Normal    Nitrite, Urine NEGATIVE NEGATIVE    Leukocyte Esterase, Urine NEGATIVE NEGATIVE    Urinalysis Comments Culture ordered based on defined criteria. Microscopic Urinalysis   Result Value Ref Range    -          WBC, UA 5 TO 10 0 - 5 /HPF    RBC, UA None 0 - 2 /HPF    Casts UA NOT REPORTED 0 - 2 /LPF    Crystals UA NOT REPORTED None /HPF    Epithelial Cells UA 10 TO 20 0 - 5 /HPF    Renal Epithelial, Urine NOT REPORTED 0 /HPF    Bacteria, UA MODERATE (A) None    Mucus, UA 2+ (A) None    Trichomonas, UA NOT REPORTED None    Amorphous, UA NOT REPORTED None    Other Observations UA NOT REPORTED NOT REQ. Yeast, UA NOT REPORTED None       EMERGENCY DEPARTMENT COURSE:  Advised patient of initial test results. Encouraged to call gynecologist.  Did provide return precautions. Patient is agreeable and understanding    CONSULTS:  None    PROCEDURES:  None    FINAL IMPRESSION      1.  Vaginal bleeding          DISPOSITION / PLAN     DISPOSITION Decision To Discharge    PATIENT REFERRED TO:  No follow-up provider specified.     DISCHARGE MEDICATIONS:  Discharge Medication List as of 5/2/2019  3:07 PM          JAZMIN Zurita CNP   Emergency Medicine Nurse Practitioner    (Please note that portions of this note were completed with a voice recognitionprogram.  Efforts were made to edit the dictations but occasionally words are mis-transcribed.)        JAZMIN Zurita CNP  05/02/19 5428

## 2019-05-02 NOTE — ED PROVIDER NOTES
[address] [facility]     Emergency Department     Faculty Attestation        I performed a history and physical examination of the patient and discussed management with the resident. I reviewed the residents note and agree with the documented findings and plan of care. Any areas of disagreement are noted on the chart. I was personally present for the key portions of any procedures. I have documented in the chart those procedures where I was not present during the key portions. I have reviewed the emergency nurses triage note. I agree with the chief complaint, past medical history, past surgical history, allergies, medications, social and family history as documented unless otherwise noted below. For Physician Assistant/ Nurse Practitioner cases/documentation I have personally evaluated this patient and have completed at least one if not all key elements of the E/M (history, physical exam, and MDM). Additional findings are as noted. Vital Signs: BP: 109/72  Pulse: 87  Resp: 16  Temp: 97 °F (36.1 °C) SpO2: 100 %  PCP:  No primary care provider on file. Pertinent Comments:     Patient is a 25-year-old female who had menstrual cycle 2 weeks ago. Now it appears she may have started again and is not normal for her here for evaluation. No back pain or cramping or urinary issues. Plan: Urine pregnancy test    Critical Care  None      (Please note that portions of this note were completed with a voice recognition program. Efforts were made to edit the dictations but occasionally words are mis-transcribed.  Whenever words are used in this note in any gender, they shall be construed as though they were used in the gender appropriate to the circumstances; and whenever words are used in this note in the singular or plural form, they shall be construed as though they were used in the form appropriate to the circumstances.)    MD Keyla Camara [facility]  Attending Emergency Medicine Physician              Doroteo Reyna MD  05/02/19 5477

## 2019-05-02 NOTE — ED NOTES
Patient complains of vaginal bleeding with blood clots for the past two days. LKMP 2 weeks ago. Patient is unsure if pregnant or not. Patient denies pain or urinary symptoms.      Guerda Layton  05/02/19 1851

## 2019-05-02 NOTE — ED NOTES
Report taken from [facility]. Care assumed at this time.      Angel Aguilera RN  05/02/19 2107

## 2019-05-03 LAB
C TRACH DNA GENITAL QL NAA+PROBE: NEGATIVE
CULTURE: NORMAL
Lab: NORMAL
N. GONORRHOEAE DNA: NEGATIVE
SPECIMEN DESCRIPTION: NORMAL
SPECIMEN DESCRIPTION: NORMAL

## 2019-11-25 ENCOUNTER — OFFICE VISIT (OUTPATIENT)
Dept: OBGYN CLINIC | Age: 24
End: 2019-11-25
Payer: COMMERCIAL

## 2019-11-25 ENCOUNTER — HOSPITAL ENCOUNTER (OUTPATIENT)
Age: 24
Setting detail: SPECIMEN
Discharge: HOME | End: 2019-11-25
Payer: COMMERCIAL

## 2019-11-25 VITALS
WEIGHT: 119 LBS | BODY MASS INDEX: 20.32 KG/M2 | DIASTOLIC BLOOD PRESSURE: 68 MMHG | HEART RATE: 60 BPM | SYSTOLIC BLOOD PRESSURE: 118 MMHG | HEIGHT: 64 IN

## 2019-11-25 DIAGNOSIS — Z20.2 STD EXPOSURE: Primary | ICD-10-CM

## 2019-11-25 PROCEDURE — G8420 CALC BMI NORM PARAMETERS: HCPCS | Performed by: ADVANCED PRACTICE MIDWIFE

## 2019-11-25 PROCEDURE — G8484 FLU IMMUNIZE NO ADMIN: HCPCS | Performed by: ADVANCED PRACTICE MIDWIFE

## 2019-11-25 PROCEDURE — G8427 DOCREV CUR MEDS BY ELIG CLIN: HCPCS | Performed by: ADVANCED PRACTICE MIDWIFE

## 2019-11-25 PROCEDURE — 99213 OFFICE O/P EST LOW 20 MIN: CPT | Performed by: ADVANCED PRACTICE MIDWIFE

## 2019-11-25 PROCEDURE — 1036F TOBACCO NON-USER: CPT | Performed by: ADVANCED PRACTICE MIDWIFE

## 2019-11-25 ASSESSMENT — ENCOUNTER SYMPTOMS
GASTROINTESTINAL NEGATIVE: 1
RESPIRATORY NEGATIVE: 1
EYES NEGATIVE: 1
ALLERGIC/IMMUNOLOGIC NEGATIVE: 1

## 2019-12-02 LAB — CYTOLOGY REPORT: NORMAL

## 2019-12-03 ENCOUNTER — TELEPHONE (OUTPATIENT)
Dept: OBGYN CLINIC | Age: 24
End: 2019-12-03

## 2019-12-03 RX ORDER — METRONIDAZOLE 7.5 MG/G
GEL VAGINAL
Qty: 1 TUBE | Refills: 0 | Status: SHIPPED | OUTPATIENT
Start: 2019-12-03 | End: 2019-12-10

## 2020-02-21 ENCOUNTER — TELEPHONE (OUTPATIENT)
Dept: FAMILY MEDICINE CLINIC | Age: 25
End: 2020-02-21

## 2021-05-10 PROBLEM — Z86.19 HISTORY OF CHLAMYDIA: Status: ACTIVE | Noted: 2021-05-10

## 2022-03-04 ENCOUNTER — HOSPITAL ENCOUNTER (EMERGENCY)
Age: 27
Discharge: HOME | End: 2022-03-04
Attending: EMERGENCY MEDICINE
Payer: COMMERCIAL

## 2022-03-04 VITALS
DIASTOLIC BLOOD PRESSURE: 55 MMHG | HEIGHT: 64 IN | WEIGHT: 124 LBS | OXYGEN SATURATION: 100 % | SYSTOLIC BLOOD PRESSURE: 98 MMHG | HEART RATE: 99 BPM | RESPIRATION RATE: 18 BRPM | BODY MASS INDEX: 21.17 KG/M2 | TEMPERATURE: 98.1 F

## 2022-03-04 DIAGNOSIS — R10.30 LOWER ABDOMINAL PAIN: Primary | ICD-10-CM

## 2022-03-04 LAB
BACTERIA: ABNORMAL
BILIRUBIN URINE: NEGATIVE
CANDIDA SPECIES, DNA PROBE: NEGATIVE
CASTS UA: ABNORMAL /LPF
COLOR: YELLOW
EPITHELIAL CELLS UA: ABNORMAL /HPF
GARDNERELLA VAGINALIS, DNA PROBE: POSITIVE
GLUCOSE URINE: NEGATIVE
HCG(URINE) PREGNANCY TEST: NEGATIVE
KETONES, URINE: ABNORMAL
LEUKOCYTE ESTERASE, URINE: ABNORMAL
NITRITE, URINE: NEGATIVE
PH UA: 6 (ref 5–8)
PROTEIN UA: ABNORMAL
RBC UA: ABNORMAL /HPF
SOURCE: ABNORMAL
SPECIFIC GRAVITY UA: 1.03 (ref 1–1.03)
TRICHOMONAS VAGINALIS DNA: NEGATIVE
TURBIDITY: CLEAR
URINE HGB: ABNORMAL
UROBILINOGEN, URINE: NORMAL
WBC UA: ABNORMAL /HPF

## 2022-03-04 PROCEDURE — 81001 URINALYSIS AUTO W/SCOPE: CPT

## 2022-03-04 PROCEDURE — 87660 TRICHOMONAS VAGIN DIR PROBE: CPT

## 2022-03-04 PROCEDURE — 87480 CANDIDA DNA DIR PROBE: CPT

## 2022-03-04 PROCEDURE — 87591 N.GONORRHOEAE DNA AMP PROB: CPT

## 2022-03-04 PROCEDURE — 87510 GARDNER VAG DNA DIR PROBE: CPT

## 2022-03-04 PROCEDURE — 99283 EMERGENCY DEPT VISIT LOW MDM: CPT

## 2022-03-04 PROCEDURE — 87491 CHLMYD TRACH DNA AMP PROBE: CPT

## 2022-03-04 PROCEDURE — 81025 URINE PREGNANCY TEST: CPT

## 2022-03-04 ASSESSMENT — ENCOUNTER SYMPTOMS
SINUS PRESSURE: 0
TROUBLE SWALLOWING: 0
DIARRHEA: 0
WHEEZING: 0
EYE REDNESS: 0
EYE DISCHARGE: 0
FACIAL SWELLING: 0
SHORTNESS OF BREATH: 0
VOMITING: 0
NAUSEA: 0
COLOR CHANGE: 0
EYE PAIN: 0
BLOOD IN STOOL: 0
CONSTIPATION: 0
COUGH: 0
RHINORRHEA: 0
ABDOMINAL PAIN: 1
CHEST TIGHTNESS: 0
BACK PAIN: 0
SORE THROAT: 0

## 2022-03-04 ASSESSMENT — PAIN DESCRIPTION - PAIN TYPE: TYPE: ACUTE PAIN

## 2022-03-04 ASSESSMENT — PAIN DESCRIPTION - ORIENTATION: ORIENTATION: MID

## 2022-03-04 ASSESSMENT — PAIN DESCRIPTION - LOCATION: LOCATION: ABDOMEN

## 2022-03-04 ASSESSMENT — PAIN DESCRIPTION - DESCRIPTORS: DESCRIPTORS: SHARP;CRAMPING

## 2022-03-04 ASSESSMENT — PAIN SCALES - GENERAL: PAINLEVEL_OUTOF10: 6

## 2022-03-04 ASSESSMENT — PAIN - FUNCTIONAL ASSESSMENT: PAIN_FUNCTIONAL_ASSESSMENT: 0-10

## 2022-03-04 NOTE — ED NOTES
Assisted Dr. Leon Waddell w pelvic exam, pt tolerated w no difficulty, pt mother remained at bedside,  Specimens collected and  sent to lab.       [place] Person  03/04/22 [id number]

## 2022-03-04 NOTE — ED TRIAGE NOTES
Mode of arrival (squad #, walk in, police, etc) : walk in        Chief complaint(s): lower mid abd pain and urinary urgency        Arrival Note (brief scenario, treatment PTA, etc). : onset 2 days ago with mid lower abd pain, urinary urgency xs 5 days. Denies any change in urinary color/smell/buring /vaginal discharge. C= \"Have you ever felt that you should Cut down on your drinking? \" n  A= \"Have people Annoyed you by criticizing your drinking? \" n  G= \"Have you ever felt bad or Guilty about your drinking? \" n  E= \"Have you ever had a drink as an Eye-opener first thing in the morning to steady your nerves or to help a hangover? \" n    Deferred []      Reason for deferring: n    *If yes to two or more: probable alcohol abuse. *

## 2022-03-04 NOTE — ED PROVIDER NOTES
[address] ED  eMERGENCY dEPARTMENT eNCOUnter      Pt Name: Moses Stephens  MRN: [id number]  [place] 1995  Date of evaluation: 3/4/22      CHIEF COMPLAINT       Chief Complaint   Patient presents with    Abdominal Pain    Dysuria         HISTORY OF PRESENT ILLNESS    Moses Stephens is a 27 y.o. female who presents complaining of abdominal pain. Patient has been having suprapubic abdominal pain for the past week. Patient states it is intermittent and seems worse if she is wearing something with a band on it. Patient states that she has had no vomiting or diarrhea associated with this. Patient had no fevers. Patient states that she has had no dysuria but she has had increased urgency. No increased frequency or hematuria that she has noted. Patient is currently on her menses but no vaginal discharge that she has noticed. REVIEW OF SYSTEMS       Review of Systems   Constitutional: Negative for activity change, appetite change, chills, diaphoresis and fever. HENT: Negative for congestion, ear pain, facial swelling, nosebleeds, rhinorrhea, sinus pressure, sore throat and trouble swallowing. Eyes: Negative for pain, discharge and redness. Respiratory: Negative for cough, chest tightness, shortness of breath and wheezing. Cardiovascular: Negative for chest pain, palpitations and leg swelling. Gastrointestinal: Positive for abdominal pain. Negative for blood in stool, constipation, diarrhea, nausea and vomiting. Genitourinary: Positive for urgency and vaginal bleeding. Negative for difficulty urinating, dysuria, flank pain, frequency, genital sores, hematuria and vaginal discharge. Musculoskeletal: Negative for arthralgias, back pain, gait problem, joint swelling, myalgias and neck pain. Skin: Negative for color change, pallor, rash and wound. Neurological: Negative for dizziness, tremors, seizures, syncope, speech difficulty, weakness, numbness and headaches. Psychiatric/Behavioral: Negative for confusion, decreased concentration, hallucinations, self-injury, sleep disturbance and suicidal ideas. PAST MEDICAL HISTORY   History reviewed. No pertinent past medical history. SURGICAL HISTORY     History reviewed. No pertinent surgical history. CURRENT MEDICATIONS       Current Discharge Medication List      CONTINUE these medications which have NOT CHANGED    Details   azithromycin (ZITHROMAX) 500 MG tablet Take two tablets together  Qty: 2 tablet, Refills: 0    Associated Diagnoses: Chlamydia      benzocaine-menthol (CEPACOL SORE THROAT) 15-3.6 MG lozenge Take 1 lozenge by mouth every 2 hours as needed for Sore Throat  Qty: 18 lozenge, Refills: 2      ibuprofen (ADVIL;MOTRIN) 800 MG tablet Take 1 tablet by mouth every 8 hours as needed for Pain or Fever  Qty: 120 tablet, Refills: 0      Norethindrone Acet-Ethinyl Est (LOESTRIN 1.5/30, 21,) 1.5-30 MG-MCG TABS Take 1 tablet by mouth daily  Qty: 30 tablet, Refills: 12    Associated Diagnoses: Family planning, BCP (birth control pills) initial prescription             ALLERGIES     has No Known Allergies. SOCIAL HISTORY      reports that she has never smoked. She has never used smokeless tobacco. She reports that she does not drink alcohol and does not use drugs. PHYSICAL EXAM     INITIAL VITALS: BP (!) 98/55   Pulse 99   Temp 98.1 °F (36.7 °C) (Oral)   Resp 18   Ht 5' 4\" (1.626 m)   Wt 124 lb (56.2 kg)   LMP 03/02/2022   SpO2 100%   BMI 21.28 kg/m²      Physical Exam  Vitals and nursing note reviewed. Exam conducted with a chaperone present. Constitutional:       General: She is not in acute distress. Appearance: She is well-developed. She is not diaphoretic. HENT:      Head: Normocephalic and atraumatic. Eyes:      General: No scleral icterus. Right eye: No discharge. Left eye: No discharge.       Conjunctiva/sclera: Conjunctivae normal.      Pupils: Pupils are equal, round, and reactive to light. Cardiovascular:      Rate and Rhythm: Normal rate and regular rhythm. Heart sounds: Normal heart sounds. No murmur heard. No friction rub. No gallop. Pulmonary:      Effort: Pulmonary effort is normal. No respiratory distress. Breath sounds: Normal breath sounds. No wheezing or rales. Chest:      Chest wall: No tenderness. Abdominal:      General: Bowel sounds are normal. There is no distension. Palpations: Abdomen is soft. There is no mass. Tenderness: There is abdominal tenderness in the suprapubic area. There is no guarding or rebound. Genitourinary:     Exam position: Lithotomy position. Labia:         Right: No tenderness. Left: No tenderness. Vagina: Bleeding present. No vaginal discharge or tenderness. Cervix: Cervical bleeding present. No cervical motion tenderness or discharge. Uterus: Normal.       Adnexa: Right adnexa normal and left adnexa normal.   Musculoskeletal:         General: No tenderness. Normal range of motion. Skin:     General: Skin is warm and dry. Coloration: Skin is not pale. Findings: No erythema or rash. Neurological:      Mental Status: She is alert and oriented to person, place, and time. Cranial Nerves: No cranial nerve deficit. Sensory: No sensory deficit. Motor: No abnormal muscle tone. Coordination: Coordination normal.      Deep Tendon Reflexes: Reflexes normal.   Psychiatric:         Behavior: Behavior normal.         Thought Content: Thought content normal.         Judgment: Judgment normal.         DIAGNOSTIC RESULTS     RADIOLOGY:All plain film, CT,MRI, and formal ultrasound images (except ED bedside ultrasound) are read by the radiologist and the interpretations are directly viewed by the emergency physician. LABS: All lab results were reviewed by myself, and all abnormals are listed below.   Labs Reviewed   URINALYSIS WITH REFLEX TO CULTURE - Abnormal; Notable for the following components:       Result Value    Ketones, Urine TRACE (*)     Specific Gravity, UA 1.034 (*)     Urine Hgb MOD (*)     Protein, UA TRACE (*)     Leukocyte Esterase, Urine TRACE (*)     All other components within normal limits   C.TRACHOMATIS N.GONORRHOEAE DNA   VAGINITIS DNA PROBE   PREGNANCY, URINE   MICROSCOPIC URINALYSIS         MEDICAL DECISION MAKING:     Patient symptoms are either most likely a urinary or vaginal complaint. We will start with urine if that clearly looks like a UTI I think we can be done if not then I will do a pelvic exam to look for any signs of infection there. I do not believe this is anything serious to where she needs a CT scan or anything further blood work wise. EMERGENCY DEPARTMENT COURSE:   Vitals:    Vitals:    03/04/22 1203   BP: (!) 98/55   Pulse: 99   Resp: 18   Temp: 98.1 °F (36.7 °C)   TempSrc: Oral   SpO2: 100%   Weight: 124 lb (56.2 kg)   Height: 5' 4\" (1.626 m)       The patient was given the following medications while in the emergency department:  No orders of the defined types were placed in this encounter. -------------------------  1:57 PM EST  Patient was updated the fact that her urine came back not looking infected. Because of this I have recommended a pelvic exam.  Patient agreed to the pelvic exam but states that she is now unable to stay for the results because she needs to go get her cat off the bus. At this point time after the exam I do not see anything that is clearly abnormal I still believe that she needs a CT or any blood work so at this point time we will let her go home for nonspecific abdominal pain and then we will keep an eye out for her lab results. CONSULTS:  None    PROCEDURES:  None    FINAL IMPRESSION      1.  Lower abdominal pain          DISPOSITION/PLAN   DISPOSITION Decision To Discharge 03/04/2022 01:57:15 PM      PATIENT REFERREDTO:  [facility] ED  [facility] [id number]  [phone]    If symptoms worsen      DISCHARGEMEDICATIONS:  Current Discharge Medication List          (Please note that portions of this note were completed with a voice recognition program.  Efforts were made to edit thedictations but occasionally words are mis-transcribed.)    Denia Pacheco MD  Attending Emergency Physician                        Denia Pacheco MD  03/04/22 [id number]       Denia Pacheco MD  03/04/22 2817

## 2022-03-04 NOTE — PROGRESS NOTES
Pharmacy Note:    Patient is positive for BV. Patient was not treated during encounter. Discussed case with Dr. Baudilio Brandt who requested patient be given a prescription for metronidazole 500 mg PO BID x7 days. Contacted patient at preferred number and discussed test results and treatment plan. She expressed understanding and requested prescription be sent to [facility]. The prescription was left on the pharmacy voicemail system.      Thank you,  Erna Ortega, PharmD, BCPS  [phone]

## 2022-03-07 LAB
C TRACH DNA GENITAL QL NAA+PROBE: NEGATIVE
N. GONORRHOEAE DNA: NEGATIVE
SPECIMEN DESCRIPTION: NORMAL